# Patient Record
Sex: MALE | Race: WHITE
[De-identification: names, ages, dates, MRNs, and addresses within clinical notes are randomized per-mention and may not be internally consistent; named-entity substitution may affect disease eponyms.]

---

## 2017-04-30 ENCOUNTER — HOSPITAL ENCOUNTER (EMERGENCY)
Dept: HOSPITAL 45 - C.EDB | Age: 22
Discharge: HOME | End: 2017-04-30
Payer: COMMERCIAL

## 2017-04-30 VITALS — DIASTOLIC BLOOD PRESSURE: 89 MMHG | SYSTOLIC BLOOD PRESSURE: 145 MMHG | HEART RATE: 60 BPM | OXYGEN SATURATION: 97 %

## 2017-04-30 VITALS
WEIGHT: 185.19 LBS | BODY MASS INDEX: 25.08 KG/M2 | BODY MASS INDEX: 25.08 KG/M2 | WEIGHT: 185.19 LBS | HEIGHT: 72.01 IN | HEIGHT: 72.01 IN

## 2017-04-30 VITALS — TEMPERATURE: 97.88 F

## 2017-04-30 DIAGNOSIS — H66.91: Primary | ICD-10-CM

## 2017-04-30 NOTE — EMERGENCY ROOM VISIT NOTE
ED Visit Note


First contact with patient:  04:35


CHIEF COMPLAINT:  Earache 





HISTORY OF PRESENT ILLNESS: This 21-year-old male presents to the emergency 

department and states they have had an earache for the past few hours.  The 

patient has not had a sore throat or recent URI.  There is no cough and no 

hoarseness.  They rate the pain as sharp and 9/10.  The pain is in the right 

ear.  They have had nothing for the pain.  


  


REVIEW OF SYSTEMS:   A 6 system review of systems was completed with positives 

and pertinent negatives listed in the HPI. 





ALLERGIES: No known allergies





MEDICATIONS: No chronic medications





PMH: Otherwise healthy.  Immunizations are up to date. 





SH: Student who lives locally





PHYSICAL EXAM: Vital Signs: Reviewed Nurse's notes  GENERAL: White male, in no 

acute distress, well-developed, well-nourished.  SKIN: Normal.  HEART: Regular 

rate and rhythm without murmurs gallops or rubs.  LUNGS: Clear to auscultation 

and breath sounds equal, no wheezes, rales, or rhonchi.  MOUTH: The pharynx is 

not inflamed and the tonsils are not enlarged.  The airway is patent.  EARS: 

The right tympanic membrane is erythematous, inflamed and bulging.  The right 

external auditory canal is clear with no tragus tenderness.  The left tympanic 

membrane is pearly gray without erythema or effusion.  The left external 

auditory canal is clear.  LYMPH:  There is no lymphadenopathy.





ED COURSE:  Physical exam and history were performed.  Nursing notes and EMR 

were reviewed.  The patient appears to have right ear pain with exam consistent 

with otitis media.  The patient will be given a course of Augmentin with his 

first dose provided here.  He was given ibuprofen and Tylenol and may continue 

this at home.  He was otherwise invited back to the ER with any new, worsening, 

or concerning symptoms.


Current/Historical Medications


Scheduled


Amoxicillin & Pot Clavulanate (Augmentin 875-125 mg), 1 TAB PO BID





Allergies


Coded Allergies:  


     No Known Allergies (Unverified , 4/30/17)





Vital Signs











  Date Time  Temp Pulse Resp B/P Pulse Ox O2 Delivery O2 Flow Rate FiO2


 


4/30/17 05:10  60 18 145/89 97   


 


4/30/17 04:30 36.6 70 18 148/84 98 Room Air  











Medications Administered











 Medications


  (Trade)  Dose


 Ordered  Sig/Ana


 Route  Start Time


 Stop Time Status Last Admin


Dose Admin


 


 Acetaminophen


  (Tylenol Tab)  1,000 mg  NOW  STAT


 PO  4/30/17 04:52


 4/30/17 04:53 DC 4/30/17 05:05


1,000 MG


 


 Ibuprofen


  (Motrin Tab)  600 mg  NOW  STAT


 PO  4/30/17 04:52


 4/30/17 04:53 DC 4/30/17 05:05


600 MG


 


 Amoxicillin/


 Clavulanate


 Potassium


  (Augmentin 875MG


 Home Pack)  1 homepack  UD  ONCE


 PO  4/30/17 05:00


 4/30/17 05:01 DC 4/30/17 05:06


1 HOMEPACK











Departure Information


Impression





 Primary Impression:  


 Right otitis media





Dispostion


Home / Self-Care





Condition


GOOD





Prescriptions





Amoxicillin & Pot Clavulanate (Augmentin 875-125 mg) 1 Tab Tab


1 TAB PO BID for 9 Days, #18 TAB


   Prov: Austen Babb PA-C         4/30/17





Forms


HOME CARE DOCUMENTATION FORM,                                                 

               IMPORTANT VISIT INFORMATION





Patient Instructions


My [facility]





Additional Instructions





You were seen and evaluated today on an emergency basis only.  This is not a 

substitute for, or an effort to provide, complete comprehensive medical care.  

It is not possible to recognize and treat all injuries or illnesses in a single 

emergency department visit. For this reason it is recommended that you followup 

with [facility] if any ongoing or persistent symptoms.





For baseline pain relief you may alternate ibuprofen and acetaminophen every 4 

hours for pain control. Take 600 mg ibuprofen (Advil) and then 4 hours later 

take 1000 mg acetaminophen (Tylenol). Do not take more than 3000 mg 

acetaminophen in a single day.  





Amoxicillin Clavulanate (Augmentin) 875mg: Take one pill twice daily for 10 

days for your infection.  All antibiotics can cause diarrhea.  If this occurs 

and you feel worse or it does not resolve in 1-2 days follow up with your 

doctor or return to the Emergency Department as this could be signs of serious 

underlying problems.  Any medication can cause an allergic reaction, stop the 

pills immediately and return to the ER for rash, hives, breathing difficulties, 

or swelling.





You are welcome to return to the emergency department anytime with new, 

worsening, or concerning symptoms.

## 2018-08-24 ENCOUNTER — HOSPITAL ENCOUNTER (EMERGENCY)
Dept: HOSPITAL 45 - C.EDB | Age: 23
Discharge: HOME | End: 2018-08-24
Payer: COMMERCIAL

## 2018-08-24 VITALS
HEIGHT: 72.01 IN | WEIGHT: 182.98 LBS | WEIGHT: 182.98 LBS | BODY MASS INDEX: 24.78 KG/M2 | BODY MASS INDEX: 24.78 KG/M2 | HEIGHT: 72.01 IN

## 2018-08-24 VITALS — TEMPERATURE: 98.24 F

## 2018-08-24 VITALS — DIASTOLIC BLOOD PRESSURE: 78 MMHG | HEART RATE: 67 BPM | OXYGEN SATURATION: 97 % | SYSTOLIC BLOOD PRESSURE: 115 MMHG

## 2018-08-24 DIAGNOSIS — J02.9: Primary | ICD-10-CM

## 2018-08-24 NOTE — EMERGENCY ROOM VISIT NOTE
ED Visit Note


First contact with patient:  11:10


CHIEF COMPLAINT: Sore throat 





HISTORY OF PRESENT ILLNESS: This 23-year-old male patient presents to the 

emergency department ambulatory, complaining of sore throat which began 3-4 

days ago.    The patient has taken ibuprofen with mild relief in his symptoms.  

They deny any other symptoms including congestion, rhinorrhea, cough, fever, 

nausea, or vomiting.  The patient does report swollen anterior cervical lymph 

nodes and chills.  He does report subjective fever.  There is pain with 

swallowing and the patient is having difficulty eating.  He does report 

clearing his throat this morning and noticing blood tinged sputum.  The patient 

does not recall any known exposure to strep throat.  Denies a rash.  The 

patient states all of his friends and roommates locally are ill with strep 

throat.  He states they have been diagnosed positive with positive rapid strep 

test.





REVIEW OF SYSTEMS: A 10 system review of systems was performed with positives 

and pertinent negatives listed in the history of present illness. All other 

systems were reviewed and are negative. 





ALLERGIES: None





MEDICATIONS: None





PMH: None





SOCIAL HISTORY: The patient is a [employer] student.  He lives locally with his 

remains.  He denies drug, tobacco use.  He does report occasional alcohol use.





PHYSICAL EXAM: 


VITALS: Vitals are noted on the nurse's note and reviewed by myself.  Vital 

signs stable.


GENERAL: This is a 23-year-old white male, in no acute distress, nondiaphoretic

, well-developed well-nourished.


SKIN: The skin was without rashes, erythema, edema, or bruising.  There is no 

tenting of the skin.  Capillary reflex less than 2 seconds.


HEAD: Normocephalic atraumatic.  


EARS: External auditory canals clear, tympanic membranes pearly gray without 

erythema or effusion bilaterally.


EYES: Pupils equal round and reactive to light and accommodation.  Conjunctivae 

without injection, sclerae without icterus.  Extraocular movements intact.  


NOSE: Patent, turbinates without inflammation or discharge.  No sinus 

tenderness.


MOUTH: Mucous membranes moist.  Tonsils are not enlarged.  Pharynx with 

erythema and exudate.  Uvula midline.  Airway patent.  Tongue does not deviate.

  


NECK: Supple without nuchal rigidity.  Anterior cervical lymphadenopathy.  No 

thyromegaly.  Cervical spine is nontender.  No JVD.


HEART: Regular rate and rhythm without murmurs gallops or rubs.


LUNGS: Clear to auscultation bilaterally without wheezes, rales or rhonchi.  No 

dullness to percussion.  No retractions or accessory muscle use.


MUSCULOSKELETAL: No muscle atrophy, erythema, or edema noted.  Full range of 

motion without joint tenderness in all extremities.  No tenderness to 

palpation.  Normal gait.  Strength 5/5 throughout.


NEURO: Patient was alert and oriented to person place and time.  Normal 

sensation to light and sharp touch.  No focal neurological deficits.





EMERGENCY DEPARTMENT COURSE: The patient was seen and evaluated as above.  

Centor criteria +3/5.  Based on the patient's history, exposure, and 

examination at this time, he will be treated for strep pharyngitis without 

rapid strep testing.  He was given his first dose of amoxicillin here in the 

emergency department today.  Discharge instructions reviewed, and the patient 

was discharged home in good condition.





I attest that I have personally reviewed the patient's current medication list. 


Patient was found to have normal blood pressure on screening and does not 

require follow-up. 





DIFFERENTIAL DIAGNOSIS:  URI, Viral pharyngitis, Strep Pharyngitis, Hand-Foot-

Mouth Disease, Peritonsillar abscess, tonsilitis, malignancy, and others





DIAGNOSIS: Acute pharyngitis








The chart was completed utilizing Dragon Speech voice recognition software. 

Grammatical errors, random word insertions, pronoun errors, and incomplete 

sentences are an occasional consequence of this system due to software 

limitations, ambient noise, and hardware issues. Any formal questions or 

concerns about the content, text, or information contained within the body of 

this dictation should be directly addressed to the provider for clarification.


Current/Historical Medications


Scheduled


Amoxicillin (Amoxil), 1,000 MG PO QD





Allergies


Coded Allergies:  


     No Known Allergies (Unverified , 8/24/18)





Vital Signs











  Date Time  Temp Pulse Resp B/P (MAP) Pulse Ox O2 Delivery O2 Flow Rate FiO2


 


8/24/18 11:39  67 18 115/78 97   


 


8/24/18 10:51 36.8 73 18 122/65 98 Room Air  











Medications Administered











 Medications


  (Trade)  Dose


 Ordered  Sig/Ana


 Route  Start Time


 Stop Time Status Last Admin


Dose Admin


 


 Amoxicillin


  (Amoxil Cap)  1,000 mg  NOW  STAT


 PO  8/24/18 11:23


 8/24/18 11:25 DC 8/24/18 11:35


1,000 MG











Departure Information


Impression





 Primary Impression:  


 Acute pharyngitis





Dispostion


Home / Self-Care





Condition


GOOD





Prescriptions





Amoxicillin (AMOXIL) 500 Mg Cap


1000 MG PO QD for 10 Days, #20 CAP


   Prov: Zulma Mathis PA-C         8/24/18





Referrals


[facility] (PCP)





Patient Instructions


ED Strep Pharyngitis [facility]





Additional Instructions





You were seen and evaluated in the emergency department today for a sore 

throat. Based on your history, symptoms, and examination, I suspect strep as 

the etiology.





For your sore throat, you may use a 1:1 mixture of liquid Benadryl and liquid 

Maalox. Gargle and spit this mixture. It will help to soothe the throat and 

provide some relief.





Drink warm tea with honey and lemon, as this will also help to soothe the 

throat.





Gargle with salt water frequently.





As discussed, you should take OTC Mucinex and/or Sudafed for your symptoms. 

Please do not exceed the recommended daily dosages.





Ibuprofen(Motrin, Advil) may be used for fever or pain. Use 600mg every six 

hours as needed. Take with food. Avoid using more than 2400mg in a 24 hour 

period. Do not use 2400mg per day for more than three consecutive days without 

physician direction. Prolonged inappropriate use can lead to stomach upset or 

ulcers. 


(AND/OR)


Acetaminophen(Tylenol) may be used for fever or pain. Use 1000mg every six 

hours as needed. Avoid using more than 3000mg in a 24 hour period. 





Please get plenty of rest and drink plenty of fluids.





No alcohol while on Amoxicillin.





No close contact with others for the next 24 hours, until your second dose of 

antibiotics.





Please return or follow-up with your PCP in 1 week if you are not experiencing 

any improvement in your symptoms.





Return to the emergency department for coughing up blood, difficulty breathing, 

chest pain, worsening symptoms, or for other concerns.





Problem Qualifiers








 Primary Impression:  


 Acute pharyngitis


 Pharyngitis/tonsillitis etiology:  unspecified etiology  Qualified Codes:  

J02.9 - Acute pharyngitis, unspecified